# Patient Record
Sex: FEMALE | URBAN - METROPOLITAN AREA
[De-identification: names, ages, dates, MRNs, and addresses within clinical notes are randomized per-mention and may not be internally consistent; named-entity substitution may affect disease eponyms.]

---

## 2024-11-12 ENCOUNTER — OFFICE VISIT (OUTPATIENT)
Dept: URGENT CARE | Age: 19
End: 2024-11-12
Payer: COMMERCIAL

## 2024-11-12 VITALS
OXYGEN SATURATION: 97 % | HEART RATE: 76 BPM | DIASTOLIC BLOOD PRESSURE: 86 MMHG | TEMPERATURE: 97.5 F | SYSTOLIC BLOOD PRESSURE: 124 MMHG

## 2024-11-12 DIAGNOSIS — J06.9 UPPER RESPIRATORY TRACT INFECTION, UNSPECIFIED TYPE: Primary | ICD-10-CM

## 2024-11-12 PROCEDURE — 99213 OFFICE O/P EST LOW 20 MIN: CPT

## 2024-11-12 RX ORDER — AZITHROMYCIN 250 MG/1
TABLET, FILM COATED ORAL
Qty: 6 EACH | Refills: 0 | Status: SHIPPED | OUTPATIENT
Start: 2024-11-12 | End: 2024-11-17

## 2024-11-12 RX ORDER — ALBUTEROL SULFATE 90 UG/1
2 INHALANT RESPIRATORY (INHALATION) EVERY 4 HOURS PRN
Qty: 8.5 G | Refills: 0 | Status: SHIPPED | OUTPATIENT
Start: 2024-11-12 | End: 2025-11-12

## 2024-11-12 RX ORDER — PREDNISONE 20 MG/1
40 TABLET ORAL DAILY
Qty: 10 TABLET | Refills: 0 | Status: SHIPPED | OUTPATIENT
Start: 2024-11-12 | End: 2024-11-17

## 2024-11-12 NOTE — PROGRESS NOTES
Subjective   Patient ID: Malka Gandhi is a 19 y.o. female. They present today with a chief complaint of Dizziness (Lightheaded, body aches, nausea, diarrhea, loss of appetite).    History of Present Illness  HPI a 19-year-old female arrives to the clinic with chief complaint of bodyaches, nausea, diarrhea, cough and chest pain.  The patient reports having the symptoms over the last week.  She also reports that she started smelling gas in her house and when she called the [employer], they found out that she had a very long/small gas leak over the last couple of months.  Because of these findings, her symptoms of cough, congestion worsened.  She is here for further evaluation health maintenance.    Past Medical History  Allergies as of 11/12/2024    (No Known Allergies)       (Not in a hospital admission)       History reviewed. No pertinent past medical history.    History reviewed. No pertinent surgical history.         Review of Systems  Review of Systems  Cough, congestion, shortness of breath, nausea    Objective    Vitals:    11/12/24 1341   BP: 124/86   Pulse: 76   Temp: 36.4 °C (97.5 °F)   SpO2: 97%     No LMP recorded.    Physical Exam  Unremarkable physical examination  Procedures    Point of Care Test & Imaging Results from this visit  No results found for this visit on 11/12/24.   No results found.    Diagnostic study results (if any) were reviewed by AGUILAR Bolanos.    Assessment/Plan   Allergies, medications, history, and pertinent labs/EKGs/Imaging reviewed by AGUILAR Bolanos.     Medical Decision Making  Upon initial assessment, physical examination is essentially benign.  Her symptoms are likely maxillary sinusitis versus URI.  I did send her home with an albuterol inhaler, prednisone, and a Z-Galileo.  Follow-up with primary care provider.  For any worsening signs or symptoms, head to the emergency department.    As a result of the work-up, the patient was discharged home.   she was informed of her diagnosis and instructed to come back with any concerns or worsening of condition.  she and was agreeable to the plan as discussed above.  she was given the opportunity to ask questions.  All of the patient's questions were answered.    This document was generated using the assistance of voice recognition software. If there are any errors of spelling, grammar, syntax, or meaning; please feel free to contact me directly for clarification.     Orders and Diagnoses  Diagnoses and all orders for this visit:  Upper respiratory tract infection, unspecified type  -     albuterol (Ventolin HFA) 90 mcg/actuation inhaler; Inhale 2 puffs every 4 hours if needed for wheezing or shortness of breath.  -     predniSONE (Deltasone) 20 mg tablet; Take 2 tablets (40 mg) by mouth once daily for 5 days.  -     azithromycin (Zithromax) 250 mg tablet; Take 2 tabs (500 mg) by mouth today, than 1 daily for 4 days.      Medical Admin Record      Patient disposition: Home    Electronically signed by AGUILAR Bolanos  2:01 PM

## 2024-12-12 ENCOUNTER — OFFICE VISIT (OUTPATIENT)
Dept: URGENT CARE | Age: 19
End: 2024-12-12
Payer: COMMERCIAL

## 2024-12-12 VITALS
DIASTOLIC BLOOD PRESSURE: 83 MMHG | OXYGEN SATURATION: 98 % | SYSTOLIC BLOOD PRESSURE: 117 MMHG | TEMPERATURE: 98 F | HEART RATE: 84 BPM | RESPIRATION RATE: 16 BRPM

## 2024-12-12 DIAGNOSIS — J06.9 VIRAL URI: Primary | ICD-10-CM

## 2024-12-12 DIAGNOSIS — J02.9 SORE THROAT: ICD-10-CM

## 2024-12-12 LAB
POC RAPID INFLUENZA A: NEGATIVE
POC RAPID INFLUENZA B: NEGATIVE
POC RAPID STREP: NEGATIVE
POC SARS-COV-2 AG BINAX: NORMAL

## 2024-12-12 ASSESSMENT — ENCOUNTER SYMPTOMS
COUGH: 0
SORE THROAT: 1
RHINORRHEA: 1
CHILLS: 1
SHORTNESS OF BREATH: 0
HEADACHES: 1
DIAPHORESIS: 1
FEVER: 0

## 2024-12-13 NOTE — PROGRESS NOTES
Subjective   Patient ID: Malka Gandhi is a 19 y.o. female. They present today with a chief complaint of Sore Throat.    History of Present Illness  Patient presents for sore throat since yesterday. She states she has had a headache, chills, slight runny nose and congestion. Denies fevers, ear pain or cough. 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          Past Medical History  Allergies as of 12/12/2024    (No Known Allergies)       (Not in a hospital admission)       No past medical history on file.    No past surgical history on file.         Review of Systems  Review of Systems   Constitutional:  Positive for chills and diaphoresis. Negative for fever.   HENT:  Positive for congestion, rhinorrhea and sore throat. Negative for ear pain.    Respiratory:  Negative for cough and shortness of breath.    Neurological:  Positive for headaches.                                  Objective    Vitals:    12/12/24 1924   BP: 117/83   Pulse: 84   Resp: 16   Temp: 36.7 °C (98 °F)   SpO2: 98%     No LMP recorded.    Physical Exam  Vitals reviewed.   Constitutional:       Appearance: She is not ill-appearing.   HENT:      Head: Normocephalic.      Right Ear: Tympanic membrane and ear canal normal.      Left Ear: Tympanic membrane and ear canal normal.      Nose: Congestion present. No rhinorrhea.      Mouth/Throat:      Pharynx: Posterior oropharyngeal erythema and postnasal drip present. No oropharyngeal exudate.      Tonsils: No tonsillar exudate or tonsillar abscesses. 1+ on the right. 1+ on the left.   Cardiovascular:      Rate and Rhythm: Normal rate and regular rhythm.      Heart sounds: Normal heart sounds.   Pulmonary:      Effort: Pulmonary effort is normal. No respiratory distress.      Breath sounds: Normal breath sounds. No wheezing or rales.   Lymphadenopathy:      Cervical: No cervical adenopathy.         Procedures    Point of Care Test & Imaging  Results from this visit  Results for orders placed or performed in visit on 12/12/24   POCT rapid strep A manually resulted   Result Value Ref Range    POC Rapid Strep Negative Negative   POCT Influenza A/B manually resulted   Result Value Ref Range    POC Rapid Influenza A Negative Negative    POC Rapid Influenza B Negative Negative   POCT BinaxNOW Covid-19 Ag Card manually resulted   Result Value Ref Range    POC GIOVANNI-COV-2 AG  Presumptive negative test for SARS-CoV-2 (no antigen detected)     Presumptive negative test for SARS-CoV-2 (no antigen detected)      No results found.    Diagnostic study results (if any) were reviewed by Snow Hobbs PA-C.    Assessment/Plan   Allergies, medications, history, and pertinent labs/EKGs/Imaging reviewed by Snow Hobbs PA-C.     Medical Decision Making  MDM: History and examination consistent with viral URI.  Rapid strep, influenza and COVID were negative.  There are no signs of pneumonia, sinusitis, otitis or other bacterial etiology. Plan at this time is supportive measures and symptomatic care at home. Advised to go to ER if worsens, otherwise follow with pcp. Patient verbalized understanding and agrees with plan.      Orders and Diagnoses  Diagnoses and all orders for this visit:  Viral URI  Sore throat  -     POCT rapid strep A manually resulted  -     POCT Influenza A/B manually resulted  -     POCT BinaxNOW Covid-19 Ag Card manually resulted      Medical Admin Record      Patient disposition: Home    Electronically signed by Snow Hobbs PA-C  7:43 PM

## 2025-04-27 ENCOUNTER — OFFICE VISIT (OUTPATIENT)
Dept: URGENT CARE | Age: 20
End: 2025-04-27
Payer: COMMERCIAL

## 2025-04-27 VITALS
OXYGEN SATURATION: 98 % | SYSTOLIC BLOOD PRESSURE: 125 MMHG | TEMPERATURE: 97.6 F | HEART RATE: 90 BPM | DIASTOLIC BLOOD PRESSURE: 80 MMHG | RESPIRATION RATE: 16 BRPM

## 2025-04-27 DIAGNOSIS — Z20.822 SUSPECTED COVID-19 VIRUS INFECTION: Primary | ICD-10-CM

## 2025-04-27 DIAGNOSIS — J32.0 MAXILLARY SINUSITIS, UNSPECIFIED CHRONICITY: ICD-10-CM

## 2025-04-27 DIAGNOSIS — B34.8 RHINOVIRUS: ICD-10-CM

## 2025-04-27 LAB
POC CORONAVIRUS SARS-COV-2 PCR: NEGATIVE
POC HUMAN RHINOVIRUS PCR: POSITIVE
POC INFLUENZA A VIRUS PCR: NEGATIVE
POC INFLUENZA B VIRUS PCR: NEGATIVE
POC RESPIRATORY SYNCYTIAL VIRUS PCR: NEGATIVE

## 2025-04-27 PROCEDURE — 99213 OFFICE O/P EST LOW 20 MIN: CPT

## 2025-04-27 PROCEDURE — 87631 RESP VIRUS 3-5 TARGETS: CPT

## 2025-04-27 RX ORDER — METHYLPREDNISOLONE 4 MG/1
TABLET ORAL
Qty: 21 TABLET | Refills: 0 | Status: SHIPPED | OUTPATIENT
Start: 2025-04-27 | End: 2025-05-03

## 2025-04-27 RX ORDER — AZITHROMYCIN 250 MG/1
TABLET, FILM COATED ORAL
Qty: 6 TABLET | Refills: 0 | Status: SHIPPED | OUTPATIENT
Start: 2025-04-27 | End: 2025-05-02

## 2025-04-27 ASSESSMENT — ENCOUNTER SYMPTOMS
EYES NEGATIVE: 1
RESPIRATORY NEGATIVE: 1
SINUS PAIN: 1
FATIGUE: 1
ACTIVITY CHANGE: 1
SINUS PRESSURE: 1
CHILLS: 1
CARDIOVASCULAR NEGATIVE: 1

## 2025-04-27 NOTE — PROGRESS NOTES
Subjective   Patient ID: Malka Gandhi is a 20 y.o. female. They present today with a chief complaint of Nasal Congestion, Generalized Body Aches (Headache), and Sinusitis (Sinus pressure).    History of Present Illness  HPI a 20-year-old female arrives to the clinic with chief complaint of bodyaches, sinus pressure, nasal congestion and discharge.  The patient reports having symptoms over the last 12 days.  She has been using over-the-counter medications with minimal relief.  She is here for evaluation.    Past Medical History  Allergies as of 04/27/2025    (No Known Allergies)       Prescriptions Prior to Admission[1]     Medical History[2]    Surgical History[3]         Review of Systems  Review of Systems   Constitutional:  Positive for activity change, chills and fatigue.   HENT:  Positive for congestion, sinus pressure and sinus pain.    Eyes: Negative.    Respiratory: Negative.     Cardiovascular: Negative.        Objective    Vitals:    04/27/25 1252   BP: 125/80   Pulse: 90   Resp: 16   Temp: 36.4 °C (97.6 °F)   SpO2: 98%     No LMP recorded.    Physical Exam  Mild congestion  Procedures    Point of Care Test & Imaging Results from this visit  Results for orders placed or performed in visit on 04/27/25   POCT SPOTFIRE R/ST Panel Mini w/COVID ([facility]) manually resulted    Specimen: Swab   Result Value Ref Range    POC Sars-Cov-2 PCR Negative Negative    POC Respiratory Syncytial Virus PCR Negative Negative    POC Influenza A Virus PCR Negative Negative    POC Influenza B Virus PCR Negative Negative    POC Human Rhinovirus PCR Positive (A) Negative      Imaging  No results found.    Cardiology, Vascular, and Other Imaging  No other imaging results found for the past 2 days      Diagnostic study results (if any) were reviewed by AGUILAR Bolanos.    Assessment/Plan   Allergies, medications, history, and pertinent labs/EKGs/Imaging reviewed by AGUILAR Bolanos.     Medical Decision  Making  Swab for COVID was positive for human rhinovirus.  Given the longevity of her symptoms, reasonable to prescribe an antibiotic for possible sinusitis.  I did send the patient home with a Z-Galileo and Medrol Dosepak.  Begin over-the-counter Zyrtec, Flonase, Tylenol Motrin.  Follow-up with primary care provider.    As a result of the work-up, the patient was discharged home.  she was informed of her diagnosis and instructed to come back with any concerns or worsening of condition.  she and was agreeable to the plan as discussed above.  she was given the opportunity to ask questions.  All of the patient's questions were answered.    This document was generated using the assistance of voice recognition software. If there are any errors of spelling, grammar, syntax, or meaning; please feel free to contact me directly for clarification.     Orders and Diagnoses  Diagnoses and all orders for this visit:  Suspected COVID-19 virus infection  -     POCT SPOTFIRE R/ST Panel Mini w/COVID ([facility]) manually resulted  Rhinovirus  Maxillary sinusitis, unspecified chronicity  -     azithromycin (Zithromax) 250 mg tablet; Take 2 tabs (500 mg) by mouth today, than 1 daily for 4 days.  -     methylPREDNISolone (Medrol Dospak) 4 mg tablets; Take as directed on package.      Medical Admin Record      Patient disposition: Home    Electronically signed by AGUILAR Bolanos  1:12 PM           [1] (Not in a hospital admission)   [2] History reviewed. No pertinent past medical history.  [3] History reviewed. No pertinent surgical history.